# Patient Record
Sex: FEMALE | Race: BLACK OR AFRICAN AMERICAN | Employment: FULL TIME | URBAN - METROPOLITAN AREA
[De-identification: names, ages, dates, MRNs, and addresses within clinical notes are randomized per-mention and may not be internally consistent; named-entity substitution may affect disease eponyms.]

---

## 2019-07-15 ENCOUNTER — ANESTHESIA EVENT (OUTPATIENT)
Dept: ENDOSCOPY | Age: 44
End: 2019-07-15
Payer: COMMERCIAL

## 2019-07-15 NOTE — ANESTHESIA PREPROCEDURE EVALUATION
Relevant Problems   No relevant active problems       Anesthetic History   No history of anesthetic complications            Review of Systems / Medical History  Patient summary reviewed, nursing notes reviewed and pertinent labs reviewed    Pulmonary        Sleep apnea: CPAP           Neuro/Psych   Within defined limits           Cardiovascular    Hypertension                   GI/Hepatic/Renal  Within defined limits              Endo/Other    Diabetes    Morbid obesity     Other Findings            Physical Exam    Airway  Mallampati: III  TM Distance: > 6 cm  Neck ROM: normal range of motion   Mouth opening: Normal     Cardiovascular  Regular rate and rhythm,  S1 and S2 normal,  no murmur, click, rub, or gallop             Dental  No notable dental hx       Pulmonary  Breath sounds clear to auscultation               Abdominal  GI exam deferred       Other Findings            Anesthetic Plan    ASA: 3  Anesthesia type: MAC            Anesthetic plan and risks discussed with: Patient

## 2019-07-16 ENCOUNTER — HOSPITAL ENCOUNTER (OUTPATIENT)
Age: 44
Setting detail: OUTPATIENT SURGERY
Discharge: HOME | End: 2019-07-16
Attending: INTERNAL MEDICINE | Admitting: INTERNAL MEDICINE
Payer: COMMERCIAL

## 2019-07-16 ENCOUNTER — ANESTHESIA (OUTPATIENT)
Dept: ENDOSCOPY | Age: 44
End: 2019-07-16
Payer: COMMERCIAL

## 2019-07-16 VITALS
HEART RATE: 88 BPM | TEMPERATURE: 97 F | RESPIRATION RATE: 15 BRPM | DIASTOLIC BLOOD PRESSURE: 91 MMHG | WEIGHT: 293 LBS | HEIGHT: 67 IN | BODY MASS INDEX: 45.99 KG/M2 | OXYGEN SATURATION: 97 % | SYSTOLIC BLOOD PRESSURE: 138 MMHG

## 2019-07-16 LAB — HCG UR QL: NEGATIVE

## 2019-07-16 PROCEDURE — 74011250636 HC RX REV CODE- 250/636

## 2019-07-16 PROCEDURE — 77030021593 HC FCPS BIOP ENDOSC BSC -A: Performed by: INTERNAL MEDICINE

## 2019-07-16 PROCEDURE — 77030027957 HC TBNG IRR ENDOGTR BUSS -B: Performed by: INTERNAL MEDICINE

## 2019-07-16 PROCEDURE — 88305 TISSUE EXAM BY PATHOLOGIST: CPT

## 2019-07-16 PROCEDURE — 76060000031 HC ANESTHESIA FIRST 0.5 HR: Performed by: INTERNAL MEDICINE

## 2019-07-16 PROCEDURE — 81025 URINE PREGNANCY TEST: CPT

## 2019-07-16 PROCEDURE — 77030009426 HC FCPS BIOP ENDOSC BSC -B: Performed by: INTERNAL MEDICINE

## 2019-07-16 PROCEDURE — 76040000019: Performed by: INTERNAL MEDICINE

## 2019-07-16 RX ORDER — MIDAZOLAM HYDROCHLORIDE 1 MG/ML
.25-5 INJECTION, SOLUTION INTRAMUSCULAR; INTRAVENOUS
Status: DISCONTINUED | OUTPATIENT
Start: 2019-07-16 | End: 2019-07-16 | Stop reason: HOSPADM

## 2019-07-16 RX ORDER — FENTANYL CITRATE 50 UG/ML
25-200 INJECTION, SOLUTION INTRAMUSCULAR; INTRAVENOUS
Status: DISCONTINUED | OUTPATIENT
Start: 2019-07-16 | End: 2019-07-16 | Stop reason: HOSPADM

## 2019-07-16 RX ORDER — SODIUM CHLORIDE 9 MG/ML
50 INJECTION, SOLUTION INTRAVENOUS CONTINUOUS
Status: DISCONTINUED | OUTPATIENT
Start: 2019-07-16 | End: 2019-07-16 | Stop reason: HOSPADM

## 2019-07-16 RX ORDER — PROPOFOL 10 MG/ML
INJECTION, EMULSION INTRAVENOUS AS NEEDED
Status: DISCONTINUED | OUTPATIENT
Start: 2019-07-16 | End: 2019-07-16 | Stop reason: HOSPADM

## 2019-07-16 RX ORDER — EPINEPHRINE 0.1 MG/ML
1 INJECTION INTRACARDIAC; INTRAVENOUS
Status: DISCONTINUED | OUTPATIENT
Start: 2019-07-16 | End: 2019-07-16 | Stop reason: HOSPADM

## 2019-07-16 RX ORDER — DEXTROMETHORPHAN/PSEUDOEPHED 2.5-7.5/.8
1.2 DROPS ORAL
Status: DISCONTINUED | OUTPATIENT
Start: 2019-07-16 | End: 2019-07-16 | Stop reason: HOSPADM

## 2019-07-16 RX ORDER — LISINOPRIL 10 MG/1
10 TABLET ORAL DAILY
COMMUNITY

## 2019-07-16 RX ORDER — METFORMIN HYDROCHLORIDE 500 MG/1
2000 TABLET ORAL DAILY
COMMUNITY

## 2019-07-16 RX ORDER — MONTELUKAST SODIUM 4 MG/1
1 TABLET, CHEWABLE ORAL 2 TIMES DAILY
COMMUNITY

## 2019-07-16 RX ORDER — FLUMAZENIL 0.1 MG/ML
0.2 INJECTION INTRAVENOUS
Status: DISCONTINUED | OUTPATIENT
Start: 2019-07-16 | End: 2019-07-16 | Stop reason: HOSPADM

## 2019-07-16 RX ORDER — SODIUM CHLORIDE 9 MG/ML
INJECTION, SOLUTION INTRAVENOUS
Status: DISCONTINUED | OUTPATIENT
Start: 2019-07-16 | End: 2019-07-16 | Stop reason: HOSPADM

## 2019-07-16 RX ORDER — LIDOCAINE HYDROCHLORIDE 20 MG/ML
INJECTION, SOLUTION EPIDURAL; INFILTRATION; INTRACAUDAL; PERINEURAL AS NEEDED
Status: DISCONTINUED | OUTPATIENT
Start: 2019-07-16 | End: 2019-07-16 | Stop reason: HOSPADM

## 2019-07-16 RX ORDER — BISMUTH SUBSALICYLATE 262 MG
1 TABLET,CHEWABLE ORAL DAILY
COMMUNITY

## 2019-07-16 RX ORDER — SODIUM CHLORIDE 0.9 % (FLUSH) 0.9 %
5-40 SYRINGE (ML) INJECTION EVERY 8 HOURS
Status: DISCONTINUED | OUTPATIENT
Start: 2019-07-16 | End: 2019-07-16 | Stop reason: HOSPADM

## 2019-07-16 RX ORDER — ATROPINE SULFATE 0.1 MG/ML
0.5 INJECTION INTRAVENOUS
Status: DISCONTINUED | OUTPATIENT
Start: 2019-07-16 | End: 2019-07-16 | Stop reason: HOSPADM

## 2019-07-16 RX ORDER — SODIUM CHLORIDE 0.9 % (FLUSH) 0.9 %
5-40 SYRINGE (ML) INJECTION AS NEEDED
Status: DISCONTINUED | OUTPATIENT
Start: 2019-07-16 | End: 2019-07-16 | Stop reason: HOSPADM

## 2019-07-16 RX ORDER — NALOXONE HYDROCHLORIDE 0.4 MG/ML
0.4 INJECTION, SOLUTION INTRAMUSCULAR; INTRAVENOUS; SUBCUTANEOUS
Status: DISCONTINUED | OUTPATIENT
Start: 2019-07-16 | End: 2019-07-16 | Stop reason: HOSPADM

## 2019-07-16 RX ADMIN — PROPOFOL 50 MG: 10 INJECTION, EMULSION INTRAVENOUS at 08:18

## 2019-07-16 RX ADMIN — PROPOFOL 100 MG: 10 INJECTION, EMULSION INTRAVENOUS at 08:20

## 2019-07-16 RX ADMIN — LIDOCAINE HYDROCHLORIDE 40 MG: 20 INJECTION, SOLUTION EPIDURAL; INFILTRATION; INTRACAUDAL; PERINEURAL at 08:14

## 2019-07-16 RX ADMIN — PROPOFOL 50 MG: 10 INJECTION, EMULSION INTRAVENOUS at 08:22

## 2019-07-16 RX ADMIN — PROPOFOL 50 MG: 10 INJECTION, EMULSION INTRAVENOUS at 08:24

## 2019-07-16 RX ADMIN — PROPOFOL 50 MG: 10 INJECTION, EMULSION INTRAVENOUS at 08:16

## 2019-07-16 RX ADMIN — PROPOFOL 100 MG: 10 INJECTION, EMULSION INTRAVENOUS at 08:14

## 2019-07-16 RX ADMIN — PROPOFOL 50 MG: 10 INJECTION, EMULSION INTRAVENOUS at 08:26

## 2019-07-16 RX ADMIN — SODIUM CHLORIDE: 9 INJECTION, SOLUTION INTRAVENOUS at 08:14

## 2019-07-16 NOTE — ROUTINE PROCESS
Ninoska Layo  1975  [id number]    Situation:  Verbal report received from: BRENDA Keith  Procedure: Procedure(s):  COLONOSCOPY  COLON BIOPSY  ENDOSCOPIC POLYPECTOMY    Background:    Preoperative diagnosis: DIARRHEA  Postoperative diagnosis: polyp    :  Dr. Anju Chong  Assistant(s): Endoscopy Technician-1: Bulmaro Sanchez  Endoscopy RN-1: Peter Mina RN    Specimens:   ID Type Source Tests Collected by Time Destination   1 : random colon Preservative Random colon  Wash MD Adam 7/16/2019 3530 Pathology   2 : polyp Preservative Sigmoid  Wash MD Adam 7/16/2019 2058 Pathology     H. Pylori  no    Assessment:  Intra-procedure medications        Anesthesia gave intra-procedure sedation and medications, see anesthesia flow sheet yes    Intravenous fluids:  500 NS @ KVO     Vital signs stable yes    Abdominal assessment: round and soft yes    Recommendation:  Discharge patient per MD order yes.   Return to floor no  Family or Friend : wife  Permission to share finding with family or friend yes

## 2019-07-16 NOTE — ANESTHESIA POSTPROCEDURE EVALUATION
Post-Anesthesia Evaluation and Assessment    Patient: Esequiel Stark MRN: [id number]  SSN: [id number]    YOB: 1975  Age: 44 y.o. Sex: female      I have evaluated the patient and they are stable and ready for discharge from the PACU. Cardiovascular Function/Vital Signs  Visit Vitals  BP (!) 138/91   Pulse 88   Temp 36.1 °C (97 °F)   Resp 15   Ht 5' 7\" (1.702 m)   Wt 147.4 kg (325 lb)   SpO2 97%   BMI 50.90 kg/m²       Patient is status post MAC anesthesia for Procedure(s):  COLONOSCOPY  COLON BIOPSY  ENDOSCOPIC POLYPECTOMY. Nausea/Vomiting: None    Postoperative hydration reviewed and adequate. Pain:  Pain Scale 1: Numeric (0 - 10) (07/16/19 6519)  Pain Intensity 1: 0 (07/16/19 2102)   Managed    Neurological Status: At baseline    Mental Status, Level of Consciousness: Alert and  oriented to person, place, and time    Pulmonary Status:   O2 Device: Room air (07/16/19 5030)   Adequate oxygenation and airway patent    Complications related to anesthesia: None    Post-anesthesia assessment completed. No concerns    Signed By: Lucius Mooney MD     July 16, 2019              Procedure(s):  COLONOSCOPY  COLON BIOPSY  ENDOSCOPIC POLYPECTOMY. MAC    <BSHSIANPOST>    Vitals Value Taken Time   /91 7/16/2019  8:52 AM   Temp     Pulse 84 7/16/2019  8:56 AM   Resp 0 7/16/2019  9:09 AM   SpO2 97 % 7/16/2019  8:56 AM   Vitals shown include unvalidated device data.

## 2019-07-16 NOTE — H&P
The patient is a 44year old female who presents with a complaint of Bloating. The patient presents due to worsening symptoms. The symptoms have been associated with abdominal pain, bloody stools and drinking large amounts of carbonated beverages, while the symptoms have not been associated with amenorrhea, anorexia, anxiety, chewing gum, constipation, diarrhea, dysmenorrhea, dysuria, hard stools, hematemesis, hematuria, history of abdominal surgery, melena, peripheral edema, poor fitting dentures, possible pregnancy, post-nasal drip, rapid eating, steatorrhea, vaginal bleeding, weight gain or weight loss. Note for \"Bloating\": she has h/o IBS and chronic bloating, intermittent abdominal pain for 20 years since after lap chol, she c/o worrsening of those sympotms for last few months, she was seen in 2017 by me, i recommended her to get colonoscopy but did not do it. she failed bentyl, align, and IB beatrice. her diarrhea got better on daily colestipol. she also c/o intermittent rectal bleeding      Problem List/Past Medical ([facility]; 2019 11:05 AM)  Diabetes Mellitus, Type II    Hypertension    Irritable bowel syndrome    Esophageal reflux (530.81  K21.9)    Abdominal pain, epigastric (789.06  R10.13)    Abdominal pain, RUQ (789.01  R10.11)    Diarrhea, functional (564.5  K59.1)    Abdominal pain (789.00  R10.9)    Diarrhea (787.91  R19.7)    Generalized abdominal pain (789.07  R10.84)    Bloating (787.3  R14.0)      Past Surgical History ([facility]; 2019 11:05 AM)   Delivery    Cholecystectomy      Allergies (Daline Roelofse; 2019 11:05 AM)  No Known Allergies  [10/06/2016]:  No Known Drug Allergies  [10/06/2016]:    Medication History (Daline Roelofse; 2019 11:08 AM)  Lisinopril  (10MG Tablet, 1 Oral qd) Active. Colestipol HCl  (1GM Tablet, 2 tabs Oral bid) Active. MetFORMIN HCl  (1000MG Tablet, 4 tabs Oral qhs) Active.   Medications Reconciled     Family History ([facility]; 6/13/2019 11:09 AM)  Non-Contributory Family History      Social History ([facility]; 6/13/2019 11:05 AM)  Marital status   Single. Blood Transfusion   No.  Alcohol Use   Has never drank. Employment status   Full-time. Tobacco Use   Never smoker. Diagnostic Studies History ([facility]; 6/13/2019 11:05 AM)  Endoscopy   Date: 2005. Health Maintenance History ([facility]; 6/13/2019 11:05 AM)  Pneumovax   none  Flu Vaccine   Date: 2018. Review of Systems ([facility]; 6/13/2019 11:05 AM)  General Not Present- Chronic Fatigue, Poor Appetite, Weight Gain and Weight Loss. Skin Not Present- Itching, Rash and Skin Color Changes. HEENT Not Present- Hearing Loss and Vertigo. Respiratory Not Present- Difficulty Breathing and TB exposure. Cardiovascular Not Present- Chest Pain, Use of Antibiotics before Dental Procedures and Use of Blood Thinners. Gastrointestinal Present- See HPI. Musculoskeletal Not Present- Arthritis, Hip Replacement Surgery and Knee Replacement Surgery. Neurological Not Present- Weakness. Psychiatric Not Present- Depression. Endocrine Not Present- Diabetes and Thyroid Problems. Hematology Not Present- Anemia. Vitals ([facility]; 6/13/2019 11:08 AM)  6/13/2019 11:05 AM  Weight: 321 lb   Height: 66 in   Body Surface Area: 2.45 m²   Body Mass Index: 51.81 kg/m²    Temp.: 97.8° F (Temporal)    Pulse: 74 (Regular)    Resp. : 16 (Unlabored)     BP: 142/80 (Sitting, Left Arm, Standard)              Physical Exam Raymona Babinski MD; 6/13/2019 12:57 PM)  General  Mental Status - Alert. General Appearance - Cooperative, Pleasant, Not in acute distress. Orientation - Oriented X3. Build & Nutrition - Obese and Well developed. Chest and Lung Exam  Chest and lung exam reveals  - quiet, even and easy respiratory effort with no use of accessory muscles.   Auscultation  Breath sounds - Normal. Adventitious sounds - No Adventitious sounds. Cardiovascular  Auscultation  Rhythm - Regular, No Tachycardia, No Bradycardia . Heart Sounds - Normal heart sounds , S1 WNL and S2 WNL. Murmurs & Other Heart Sounds - Auscultation of the heart reveals - No Murmurs. Abdomen  Palpation/Percussion  Tenderness - Non-Tender. Rebound tenderness - No rebound. Rigidity (guarding) - No Rigidity. Dullness to percussion - No abnormal dullness to percussion. Liver - No hepatosplenomegaly. Abdominal Mass Palpable - No masses. Other Characteristics - No Ascites. Auscultation  Auscultation of the abdomen reveals - Bowel sounds normal, No Abdominal bruits and No Succussion splash. Assessment & Plan (Lupe Gillespie MD; 6/13/2019 1:00 PM)  Rectal/anal hemorrhage (569.3  K62.5)  Impression: she has h/o IBS and chronic bloating, intermittent abdominal pain for 20 years since after lap chol, she c/o worrsening of those sympotms for last few months, she was seen in 2017 by me, i recommended her to get colonoscopy but did not do it. she failed bentyl, align, and IB beatrice. her diarrhea got better on daily colestipol. she also c/o intermittent rectal bleeding  colonoscopy to r/o any colitis or colonic neoplasm  given clenpiq prep sample  Current Plans  Colonoscopy (73825) (Discussed risks and benefits with the patient to include:; perforation, post polypectomy, or post biopsy bleeding, missed lesions, and sedation reactions.)  Pt Education - How to access health information online: discussed with patient and provided information. Bloating (787.3  R14.0)  Impression: trial of low FOADMAP diet  refered to Dr. Camille Virgen for food allergy testing  Diarrhea (787.91  R19.7)  Generalized abdominal pain (789.07  R10.84)  Impression: will consider abdominal imaging if not better  Current Plans  Started Hyoscyamine Sulfate 0.125MG, 1 (one) Tablet TID as needed for abdominal pain, #30, 30 days starting 06/13/2019, Ref.  X6.  Date of Surgery Update:  Beth Bledsoe was seen and examined. History and physical has been reviewed. The patient has been examined.  There have been no significant clinical changes since the completion of the originally dated History and Physical.    Signed By: Ashley Madrid MD     July 16, 2019 7:57 AM

## 2019-07-16 NOTE — DISCHARGE INSTRUCTIONS
[address]  [address] [facility], [address] [place] [address]    COLON DISCHARGE INSTRUCTIONS    Leelee Zhu  [id number]  1975    Discomfort:  Redness at IV site- apply warm compress to area; if redness or soreness persist- contact your physician  There may be a slight amount of blood passed from the rectum  Gaseous discomfort- walking, belching will help relieve any discomfort  You may not operate a vehicle for 12 hours  You may not engage in an occupation involving machinery or appliances for rest of today  You may not drink alcoholic beverages for at least 12 hours  Avoid making any critical decisions for at least 24 hour  DIET:  You may resume your regular diet - however -  remember your colon is empty and a heavy meal will produce gas. Avoid these foods:  vegetables, fried / greasy foods, carbonated drinks     ACTIVITY:  You may  resume your normal daily activities it is recommended that you spend the remainder of the day resting -  avoid any strenuous activity. CALL M.D. ANY SIGN OF:   Increasing pain, nausea, vomiting  Abdominal distension (swelling)  New increased bleeding (oral or rectal)  Fever (chills)  Pain in chest area  Bloody discharge from nose or mouth  Shortness of breath      Follow-up Instructions:   Call Dr. Yodit Fenton for any questions or problems at [phone] and follow up with him in 1 month  Will notify you of the biopsies results            ENDOSCOPY FINDINGS:   Your colonoscopy showed one small polyp removed, rest of exam was normal, biopsies taken.   Telephone # [phone]      Signed By: Yodit Fenton MD     7/16/2019  8:39 AM       DISCHARGE SUMMARY from Nurse    The following personal items collected during your admission are returned to you:   Dental Appliance: Dental Appliances: None  Vision: Visual Aid: Glasses  Hearing Aid:    Jewelry:    Clothing:    Other Valuables:    Valuables sent to safe:

## 2019-07-16 NOTE — PERIOP NOTES

## 2019-07-16 NOTE — PROCEDURES
[facility]  [place], [address]      Colonoscopy Operative Report    Madeleine Blake  [id number]  1975      Procedure Type:   Colonoscopy with biopsy, polypectomy (hot biopsy)     Indications:    Diarrhea       Pre-operative Diagnosis: see indication above    Post-operative Diagnosis:  See findings below    :  Arthur Padron MD    Surgical Assistant: None    Implants:  None    Referring Provider: Diana Restrepo MD      Sedation:  MAC anesthesia Propofol      Procedure Details:  After informed consent was obtained with all risks and benefits of procedure explained and preoperative exam completed, the patient was taken to the endoscopy suite and placed in the left lateral decubitus position. Upon sequential sedation as per above, a digital rectal exam was performed demonstrating internal hemorrhoids. The Olympus videocolonoscope  was inserted in the rectum and carefully advanced to the cecum, which was identified by the ileocecal valve and appendiceal orifice, terminal ileum. The cecum was identified by the ileocecal valve and appendiceal orifice. The quality of preparation was good. The colonoscope was slowly withdrawn with careful evaluation between folds. Retroflexion in the rectum was completed . Findings:   Rectum: normal  Small internal hemorrhoids  Sigmoid: normal  9 mm polyp removed by hot biopsy  Descending Colon: normal  Transverse Colon: normal  Ascending Colon: normal  Cecum: normal  Terminal Ileum: normal    Random colonic biopsies taken      Specimen Removed:  as above    Complications: None. EBL:  None. Impression:    see findings    Recommendations: --Await pathology.       Follow up in 1 month  Continue on colestipol    Signed By: Arthur Padron MD     7/16/2019  8:34 AM

## 2023-02-02 RX ORDER — GLIPIZIDE 10 MG/1
10 TABLET, FILM COATED, EXTENDED RELEASE ORAL DAILY
COMMUNITY

## 2023-02-02 RX ORDER — ATORVASTATIN CALCIUM 10 MG/1
10 TABLET, FILM COATED ORAL DAILY
COMMUNITY

## 2023-02-02 RX ORDER — VALACYCLOVIR HYDROCHLORIDE 500 MG/1
TABLET, FILM COATED ORAL AS NEEDED
COMMUNITY

## 2023-02-02 RX ORDER — LISINOPRIL 40 MG/1
40 TABLET ORAL DAILY
COMMUNITY

## 2023-02-02 RX ORDER — METFORMIN HYDROCHLORIDE 500 MG/1
2000 TABLET, EXTENDED RELEASE ORAL EVERY EVENING
COMMUNITY

## 2023-02-02 RX ORDER — ACETAMINOPHEN AND CODEINE PHOSPHATE 120; 12 MG/5ML; MG/5ML
1 SOLUTION ORAL DAILY
COMMUNITY

## 2023-02-02 RX ORDER — DULAGLUTIDE 4.5 MG/.5ML
4.5 INJECTION, SOLUTION SUBCUTANEOUS
COMMUNITY

## 2023-02-02 RX ORDER — LANOLIN ALCOHOL/MO/W.PET/CERES
1000 CREAM (GRAM) TOPICAL DAILY
COMMUNITY

## 2023-02-09 ENCOUNTER — ANESTHESIA EVENT (OUTPATIENT)
Dept: ENDOSCOPY | Age: 48
End: 2023-02-09
Payer: COMMERCIAL

## 2023-02-09 ENCOUNTER — HOSPITAL ENCOUNTER (OUTPATIENT)
Age: 48
Setting detail: OUTPATIENT SURGERY
Discharge: HOME | End: 2023-02-09
Attending: INTERNAL MEDICINE | Admitting: INTERNAL MEDICINE
Payer: COMMERCIAL

## 2023-02-09 ENCOUNTER — ANESTHESIA (OUTPATIENT)
Dept: ENDOSCOPY | Age: 48
End: 2023-02-09
Payer: COMMERCIAL

## 2023-02-09 VITALS
TEMPERATURE: 98.6 F | OXYGEN SATURATION: 94 % | HEIGHT: 66 IN | SYSTOLIC BLOOD PRESSURE: 132 MMHG | RESPIRATION RATE: 21 BRPM | WEIGHT: 293 LBS | DIASTOLIC BLOOD PRESSURE: 79 MMHG | BODY MASS INDEX: 47.09 KG/M2 | HEART RATE: 92 BPM

## 2023-02-09 LAB
GLUCOSE BLD STRIP.AUTO-MCNC: 131 MG/DL (ref 65–117)
HCG UR QL: NEGATIVE
SERVICE CMNT-IMP: ABNORMAL

## 2023-02-09 PROCEDURE — 77030021593 HC FCPS BIOP ENDOSC BSC -A: Performed by: INTERNAL MEDICINE

## 2023-02-09 PROCEDURE — 74011250636 HC RX REV CODE- 250/636: Performed by: ANESTHESIOLOGY

## 2023-02-09 PROCEDURE — 76060000031 HC ANESTHESIA FIRST 0.5 HR: Performed by: INTERNAL MEDICINE

## 2023-02-09 PROCEDURE — 76040000019: Performed by: INTERNAL MEDICINE

## 2023-02-09 PROCEDURE — 82962 GLUCOSE BLOOD TEST: CPT

## 2023-02-09 PROCEDURE — 77030039825 HC MSK NSL PAP SUPERNO2VA VYRM -B: Performed by: ANESTHESIOLOGY

## 2023-02-09 PROCEDURE — 88342 IMHCHEM/IMCYTCHM 1ST ANTB: CPT

## 2023-02-09 PROCEDURE — 81025 URINE PREGNANCY TEST: CPT

## 2023-02-09 PROCEDURE — 2709999900 HC NON-CHARGEABLE SUPPLY: Performed by: INTERNAL MEDICINE

## 2023-02-09 PROCEDURE — 74011000250 HC RX REV CODE- 250: Performed by: ANESTHESIOLOGY

## 2023-02-09 PROCEDURE — 88305 TISSUE EXAM BY PATHOLOGIST: CPT

## 2023-02-09 RX ORDER — PROPOFOL 10 MG/ML
INJECTION, EMULSION INTRAVENOUS AS NEEDED
Status: DISCONTINUED | OUTPATIENT
Start: 2023-02-09 | End: 2023-02-09 | Stop reason: HOSPADM

## 2023-02-09 RX ORDER — EPINEPHRINE 0.1 MG/ML
1 INJECTION INTRACARDIAC; INTRAVENOUS
Status: DISCONTINUED | OUTPATIENT
Start: 2023-02-09 | End: 2023-02-09 | Stop reason: HOSPADM

## 2023-02-09 RX ORDER — SODIUM CHLORIDE 0.9 % (FLUSH) 0.9 %
5-40 SYRINGE (ML) INJECTION EVERY 8 HOURS
Status: DISCONTINUED | OUTPATIENT
Start: 2023-02-09 | End: 2023-02-09 | Stop reason: HOSPADM

## 2023-02-09 RX ORDER — FLUMAZENIL 0.1 MG/ML
0.2 INJECTION INTRAVENOUS
Status: DISCONTINUED | OUTPATIENT
Start: 2023-02-09 | End: 2023-02-09 | Stop reason: HOSPADM

## 2023-02-09 RX ORDER — DEXTROMETHORPHAN/PSEUDOEPHED 2.5-7.5/.8
1.2 DROPS ORAL
Status: DISCONTINUED | OUTPATIENT
Start: 2023-02-09 | End: 2023-02-09 | Stop reason: HOSPADM

## 2023-02-09 RX ORDER — SODIUM CHLORIDE 0.9 % (FLUSH) 0.9 %
5-40 SYRINGE (ML) INJECTION AS NEEDED
Status: DISCONTINUED | OUTPATIENT
Start: 2023-02-09 | End: 2023-02-09 | Stop reason: HOSPADM

## 2023-02-09 RX ORDER — FENTANYL CITRATE 50 UG/ML
25-200 INJECTION, SOLUTION INTRAMUSCULAR; INTRAVENOUS
Status: DISCONTINUED | OUTPATIENT
Start: 2023-02-09 | End: 2023-02-09 | Stop reason: HOSPADM

## 2023-02-09 RX ORDER — ATROPINE SULFATE 0.1 MG/ML
0.5 INJECTION INTRAVENOUS
Status: DISCONTINUED | OUTPATIENT
Start: 2023-02-09 | End: 2023-02-09 | Stop reason: HOSPADM

## 2023-02-09 RX ORDER — NALOXONE HYDROCHLORIDE 0.4 MG/ML
0.4 INJECTION, SOLUTION INTRAMUSCULAR; INTRAVENOUS; SUBCUTANEOUS
Status: DISCONTINUED | OUTPATIENT
Start: 2023-02-09 | End: 2023-02-09 | Stop reason: HOSPADM

## 2023-02-09 RX ORDER — MIDAZOLAM HYDROCHLORIDE 1 MG/ML
.25-5 INJECTION, SOLUTION INTRAMUSCULAR; INTRAVENOUS
Status: DISCONTINUED | OUTPATIENT
Start: 2023-02-09 | End: 2023-02-09 | Stop reason: HOSPADM

## 2023-02-09 RX ORDER — SODIUM CHLORIDE 9 MG/ML
50 INJECTION, SOLUTION INTRAVENOUS CONTINUOUS
Status: DISCONTINUED | OUTPATIENT
Start: 2023-02-09 | End: 2023-02-09 | Stop reason: HOSPADM

## 2023-02-09 RX ORDER — LIDOCAINE HYDROCHLORIDE 20 MG/ML
INJECTION, SOLUTION EPIDURAL; INFILTRATION; INTRACAUDAL; PERINEURAL AS NEEDED
Status: DISCONTINUED | OUTPATIENT
Start: 2023-02-09 | End: 2023-02-09 | Stop reason: HOSPADM

## 2023-02-09 RX ORDER — SODIUM CHLORIDE 9 MG/ML
INJECTION, SOLUTION INTRAVENOUS
Status: DISCONTINUED | OUTPATIENT
Start: 2023-02-09 | End: 2023-02-09 | Stop reason: HOSPADM

## 2023-02-09 RX ADMIN — PROPOFOL 100 MG: 10 INJECTION, EMULSION INTRAVENOUS at 10:31

## 2023-02-09 RX ADMIN — PROPOFOL 50 MG: 10 INJECTION, EMULSION INTRAVENOUS at 10:34

## 2023-02-09 RX ADMIN — LIDOCAINE HYDROCHLORIDE 80 MG: 20 INJECTION, SOLUTION EPIDURAL; INFILTRATION; INTRACAUDAL; PERINEURAL at 10:30

## 2023-02-09 RX ADMIN — PROPOFOL 50 MG: 10 INJECTION, EMULSION INTRAVENOUS at 10:38

## 2023-02-09 RX ADMIN — PROPOFOL 100 MG: 10 INJECTION, EMULSION INTRAVENOUS at 10:30

## 2023-02-09 RX ADMIN — PROPOFOL 100 MG: 10 INJECTION, EMULSION INTRAVENOUS at 10:40

## 2023-02-09 RX ADMIN — PROPOFOL 50 MG: 10 INJECTION, EMULSION INTRAVENOUS at 10:35

## 2023-02-09 RX ADMIN — PROPOFOL 100 MG: 10 INJECTION, EMULSION INTRAVENOUS at 10:44

## 2023-02-09 RX ADMIN — SODIUM CHLORIDE: 900 INJECTION, SOLUTION INTRAVENOUS at 10:14

## 2023-02-09 RX ADMIN — PROPOFOL 50 MG: 10 INJECTION, EMULSION INTRAVENOUS at 10:37

## 2023-02-09 RX ADMIN — PROPOFOL 100 MG: 10 INJECTION, EMULSION INTRAVENOUS at 10:51

## 2023-02-09 RX ADMIN — PROPOFOL 100 MG: 10 INJECTION, EMULSION INTRAVENOUS at 10:48

## 2023-02-09 NOTE — DISCHARGE INSTRUCTIONS
[address], [address] [place] [address]    EGD/COLON DISCHARGE INSTRUCTIONS    Ld Neely  [id number]  1975    Discomfort:  Sore throat- throat lozenges or warm salt water gargle  redness at IV site- apply warm compress to area; if redness or soreness persist- contact your physician  Gaseous discomfort- walking, belching will help relieve any discomfort  You may not operate a vehicle for 12 hours  You may not engage in an occupation involving machinery or appliances for rest of today  You may not drink alcoholic beverages for at least 12 hours  Avoid making any critical decisions for at least 24 hour  DIET  You may resume your regular diet - however -  remember your colon is empty and a heavy meal will produce gas. Avoid these foods:  vegetables, fried / greasy foods, carbonated drinks    ACTIVITY  You may resume your normal daily activities   Spend the remainder of the day resting -  avoid any strenuous activity. CALL M.D. ANY SIGN OF   Increasing pain, nausea, vomiting  Abdominal distension (swelling)  New increased bleeding (oral or rectal)  Fever (chills)  Pain in chest area  Bloody discharge from nose or mouth  Shortness of breath    Follow-up Instructions:   Call Dr. Kalin Morillo for any questions or problems. Telephone # [phone]    ENDOSCOPY FINDINGS:   Your endoscopy showed mild gastritis for which biopsies were taken. We will contact you about the pathology results. Please avoid NSAID's, alcohol, tobacco products. Your colonoscopy was normal. Your next colonoscopy will be due in 10 years. Signed By: Bryan Rivera. Bunny Figueredo MD     2/9/2023  11:01 AM          Gastritis: Care Instructions  Your Care Instructions     Gastritis is a sore and upset stomach. It happens when something irritates the stomach lining. Many things can cause it. These include an infection such as the flu or something you ate or drank.  Medicines or a sore on the lining of the stomach (ulcer) also can cause it. Your belly may bloat and ache. You may belch, vomit, and feel sick to your stomach. You should be able to relieve the problem by taking medicine. And it may help to change your diet. If gastritis lasts, your doctor may prescribe medicine. Follow-up care is a key part of your treatment and safety. Be sure to make and go to all appointments, and call your doctor if you are having problems. It's also a good idea to know your test results and keep a list of the medicines you take. How can you care for yourself at home? If your doctor prescribed antibiotics, take them as directed. Do not stop taking them just because you feel better. You need to take the full course of antibiotics. Be safe with medicines. If your doctor prescribed medicine to decrease stomach acid, take it as directed. Call your doctor if you think you are having a problem with your medicine. Do not take any other medicine, including over-the-counter pain relievers, without talking to your doctor first.  If your doctor recommends over-the-counter medicine to reduce stomach acid, such as Pepcid AC (famotidine), Prilosec (omeprazole), or Tagamet HB (cimetidine) follow the directions on the label. Drink plenty of fluids to prevent dehydration. Choose water and other clear liquids. If you have kidney, heart, or liver disease and have to limit fluids, talk with your doctor before you increase the amount of fluids you drink. Avoid foods that make your symptoms worse. These may include chocolate, mint, alcohol, pepper, spicy foods, high-fat foods, or drinks with caffeine in them, such as tea, coffee, johanna, or energy drinks. If your symptoms are worse after you eat a certain food, you may want to stop eating it to see if your symptoms get better. When should you call for help? Call 911 anytime you think you may need emergency care. For example, call if:    You vomit blood or what looks like coffee grounds.      You pass maroon or very bloody stools. Call your doctor now or seek immediate medical care if:    You start breathing fast and have not produced urine in the last 8 hours. You cannot keep fluids down. Watch closely for changes in your health, and be sure to contact your doctor if:    You do not get better as expected. Where can you learn more? Go to http://www.clinton.com/  Enter Z536 in the search box to learn more about \"Gastritis: Care Instructions. \"  Current as of: June 6, 2022               Content Version: 13.4  © 0077-9341 [employer]. Care instructions adapted under license by [employer] (which disclaims liability or warranty for this information). If you have questions about a medical condition or this instruction, always ask your healthcare professional. [address] any warranty or liability for your use of this information.

## 2023-02-09 NOTE — ANESTHESIA POSTPROCEDURE EVALUATION
Procedure(s):  ESOPHAGOGASTRODUODENOSCOPY (EGD)  COLONOSCOPY  ESOPHAGOGASTRODUODENAL (EGD) BIOPSY. MAC    Anesthesia Post Evaluation        Patient location during evaluation: PACU  Patient participation: complete - patient participated  Level of consciousness: awake  Pain management: adequate  Airway patency: patent  Anesthetic complications: no  Cardiovascular status: acceptable  Respiratory status: acceptable  Hydration status: acceptable  Post anesthesia nausea and vomiting:  none  Final Post Anesthesia Temperature Assessment:  Normothermia (36.0-37.5 degrees C)      INITIAL Post-op Vital signs:   Vitals Value Taken Time   /88 02/09/23 1105   Temp 37 °C (98.6 °F) 02/09/23 1057   Pulse 100 02/09/23 1105   Resp 20 02/09/23 1105   SpO2 94 % 02/09/23 1105   Vitals shown include unvalidated device data.

## 2023-02-09 NOTE — ANESTHESIA PREPROCEDURE EVALUATION
Relevant Problems   No relevant active problems       Anesthetic History   No history of anesthetic complications            Review of Systems / Medical History  Patient summary reviewed, nursing notes reviewed and pertinent labs reviewed    Pulmonary        Sleep apnea: CPAP           Neuro/Psych              Cardiovascular    Hypertension          Hyperlipidemia         GI/Hepatic/Renal     GERD           Endo/Other    Diabetes    Morbid obesity     Other Findings              Physical Exam    Airway  Mallampati: IV  TM Distance: 4 - 6 cm  Neck ROM: normal range of motion   Mouth opening: Normal     Cardiovascular  Regular rate and rhythm,  S1 and S2 normal,  no murmur, click, rub, or gallop             Dental  No notable dental hx       Pulmonary  Breath sounds clear to auscultation               Abdominal         Other Findings            Anesthetic Plan    ASA: 3  Anesthesia type: MAC          Induction: Intravenous  Anesthetic plan and risks discussed with: Patient

## 2023-02-09 NOTE — PROCEDURES
[address]  174 [facility], [address] [place] [address]      Colonoscopy Operative Report    Kecia South  [id number]  1975      Procedure Type:   Colonoscopy --diagnostic     Indications:    history of colitis, elevated CRP          Pre-operative Diagnosis: see indication above    Post-operative Diagnosis:  See findings below    :  Serjio Navarro. Nicole García MD    Staff: Endoscopy Moy Fry: Vickye Cuff  Endoscopy RN-1: Bruce Cavanaugh RN     Referring Provider: Brandan Guzman MD      Sedation:  MAC anesthesia Propofol      Procedure Details:  After informed consent was obtained with all risks and benefits of procedure explained and preoperative exam completed, the patient was taken to the endoscopy suite and placed in the left lateral decubitus position. Upon sequential sedation as per above, a digital rectal exam was performed demonstrating internal hemorrhoids. The Olympus pediatric videocolonoscope  was inserted in the rectum and carefully advanced to the cecum, which was identified by the ileocecal valve and appendiceal orifice. The cecum was identified by the ileocecal valve and appendiceal orifice. The quality of preparation was good. The colonoscope was slowly withdrawn with careful evaluation between folds. Retroflexion in the rectum was completed . Findings:   Rectum: normal  Sigmoid: normal  Descending Colon: normal  Transverse Colon: normal  Ascending Colon: normal  Cecum: normal  Terminal Ileum: not intubated due to anatomical lpositioning and scope looping      Specimen Removed:  none    Complications: None. EBL:  None. Impression:      As above    Recommendations:  Repeat colonoscopy in 10 years  High fiber diet education    Signed By: Serjio Navarro.  Nicole García MD     2/9/2023  11:05 AM

## 2023-02-09 NOTE — PROCEDURES
[address], [address] [place] [address]        [facility]- Gastroduodenoscopy (EGD) Procedure Note    Terri Valera  1975  [id number]      Procedure: Endoscopic Gastroduodenoscopy --diagnostic, with biopsy    Indication:  Abdominal pain, epigastric, GERD     Pre-operative Diagnosis: see indication above    Post-operative Diagnosis: see findings below    : Shi Eden MD    Staff: Endoscopy Technician-1: Sae Shay  Endoscopy RN-1: Joyce Wright RN     Referring Provider:  Kolby Stein MD      Anesthesia/Sedation:  MAC anesthesia Propofol        Procedure Details     After informed consent was obtained for the procedure, with all risks and benefits of procedure explained the patient was taken to the endoscopy suite and placed in the left lateral decubitus position. Following sequential administration of sedation as per above, the endoscope was inserted into the mouth and advanced under direct vision to second portion of the duodenum. A careful inspection was made as the gastroscope was withdrawn, including a retroflexed view of the proximal stomach; findings and interventions are described below. Findings:   Esophagus:normal  Stomach: mild patchy antral erythema - cold biopsies taken  Duodenum: normal to second portion - cold biopsies taken      Therapies:  as above    Specimens: 1. Duodenum, 2. gastric         EBL: None      Complications:   None; patient tolerated the procedure well. Impression:    As above    Recommendations: Follow up surgical pathology  Proceed to colonoscopy    Signed By: Shi Eden MD     2/9/2023  11:03 AM

## 2023-02-09 NOTE — H&P
[address]  174 [facility], [address] [place] [address]      History and Physical       NAME:  Zarina Kasper   :   1975   MRN:   [id number]             History of Present Illness:  Patient is a 56 y.o. who is seen for history of GERD, bloating, and history of colitis. PMH:  Past Medical History:   Diagnosis Date    Diabetes ([address]     GERD (gastroesophageal reflux disease)     Hypertension     Ill-defined condition     elevated cholesterol    Ill-defined condition     sciatica    Morbid obesity (HCC)     Sleep apnea     use cpap       PSH:  Past Surgical History:   Procedure Laterality Date    COLONOSCOPY N/A 2019    COLONOSCOPY performed by Diana Mcmullen MD at [address] [place] [address] [place]      x 2    HX CHOLECYSTECTOMY      HX TONSILLECTOMY         Allergies:  No Known Allergies    Home Medications:  Prior to Admission Medications   Prescriptions Last Dose Informant Patient Reported? Taking? FERROUS SULFATE PO 2023  Yes Yes   Sig: Take 324 mg by mouth two (2) times a day. atorvastatin (LIPITOR) 10 mg tablet 2023  Yes Yes   Sig: Take 10 mg by mouth daily. colestipol (COLESTID) 1 gram tablet   Yes Yes   Sig: Take 2 g by mouth two (2) times a day. cyanocobalamin 1,000 mcg tablet 2023  Yes Yes   Sig: Take 1,000 mcg by mouth daily. dapagliflozin (FARXIGA) 10 mg tab tablet 2023  Yes Yes   Sig: Take 10 mg by mouth daily. dulaglutide (Trulicity) 4.5 WJ/7.4 mL pnij 2022  Yes Yes   Si.5 mg by SubCUTAneous route every seven (7) days. glipiZIDE SR (GLUCOTROL XL) 10 mg CR tablet 2023  Yes Yes   Sig: Take 10 mg by mouth daily. lisinopriL (PRINIVIL, ZESTRIL) 40 mg tablet 2023  Yes Yes   Sig: Take 40 mg by mouth daily. metFORMIN ER (GLUCOPHAGE XR) 500 mg tablet 2023  Yes Yes   Sig: Take 2,000 mg by mouth every evening. norethindrone (MICRONOR) 0.35 mg tab 2023  Yes Yes   Sig: Take 1 Tablet by mouth daily. valACYclovir (VALTREX) 500 mg tablet Unknown  Yes No   Sig: as needed. Facility-Administered Medications: None       Hospital Medications:  No current facility-administered medications for this encounter. Social History:  Social History     Tobacco Use    Smoking status: Never    Smokeless tobacco: Never   Substance Use Topics    Alcohol use: Yes     Comment: rare       Family History:  Family History   Problem Relation Age of Onset    Heart Disease Mother     Heart Disease Sister              Review of Systems:      Constitutional: negative fever, negative chills, negative weight loss  Eyes:   negative visual changes  ENT:   negative sore throat, tongue or lip swelling  Respiratory:  negative cough, negative dyspnea  Cards:  negative for chest pain, palpitations, lower extremity edema  GI:   See HPI  :  negative for frequency, dysuria  Integument:  negative for rash and pruritus  Heme:  negative for easy bruising and gum/nose bleeding  Musculoskel: negative for myalgias,  back pain and muscle weakness  Neuro: negative for headaches, dizziness, vertigo  Psych:  negative for feelings of anxiety, depression       Objective:   Patient Vitals for the past 8 hrs:   Height Weight   02/09/23 0937 5' 6\" (1.676 m) 135.6 kg (299 lb)     No intake/output data recorded. No intake/output data recorded. EXAM:     NEURO-a&o   HEENT-wnl   LUNGS-clear    COR-regular rate and rhythym     ABD-soft , no tenderness, no rebound, good bs     EXT-no edema     Data Review     No results for input(s): WBC, HGB, HCT, PLT, HGBEXT, HCTEXT, PLTEXT in the last 72 hours. No results for input(s): NA, K, CL, CO2, BUN, CREA, GLU, PHOS, CA in the last 72 hours. No results for input(s): AP, TBIL, TP, ALB, GLOB, GGT, AML, LPSE in the last 72 hours. No lab exists for component: SGOT, GPT, AMYP, HLPSE  No results for input(s): INR, PTP, APTT, INREXT in the last 72 hours.        Assessment:     GERD  Bloating  History of colitis There is no problem list on file for this patient. Plan:   The patient was counseled at length about the risks of awais Covid-19 in the john-operative and post-operative states including the recovery window of their procedure. The patient was made aware that awais Covid-19 after a surgical procedure may worsen their prognosis for recovering from the virus and lend to a higher morbidity and or mortality risk. The patient was given the options of postponing their procedure. All of the risks, benefits, and alternatives were discussed. The patient does wish to proceed with the procedure. Endoscopic procedure with MAC     Signed By: Diamond Núñez.  Sarai Kesy MD     2/9/2023  10:16 AM

## (undated) DEVICE — Z DISCONTINUED USE 2751540 TUBING IRRIG L10IN DISP PMP ENDOGATOR

## (undated) DEVICE — NEONATAL-ADULT SPO2 SENSOR: Brand: NELLCOR

## (undated) DEVICE — KENDALL RADIOLUCENT FOAM MONITORING ELECTRODE -RECTANGULAR SHAPE: Brand: KENDALL

## (undated) DEVICE — BAG BELONG PT PERS CLEAR HANDL

## (undated) DEVICE — NEEDLE HYPO 18GA L1.5IN PNK S STL HUB POLYPR SHLD REG BVL

## (undated) DEVICE — BAG SPEC BIOHZD LF 2MIL 6X10IN -- CONVERT TO ITEM 357326

## (undated) DEVICE — UNDERPAD XTR STRGHT 30X36IN --

## (undated) DEVICE — ENDO CARRY-ON PROCEDURE KIT INCLUDES ENZYMATIC SPONGE, GAUZE, BIOHAZARD LABEL, TRAY, LUBRICANT, DIRTY SCOPE LABEL, WATER LABEL, TRAY, DRAWSTRING PAD, AND DEFENDO 4-PIECE KIT.: Brand: ENDO CARRY-ON PROCEDURE KIT

## (undated) DEVICE — AIRLIFE™ U/CONNECT-IT OXYGEN TUBING 7 FEET (2.1 M) CRUSH-RESISTANT OXYGEN TUBING, VINYL TIPPED: Brand: AIRLIFE™

## (undated) DEVICE — FORCEPS BX L240CM JAW DIA2.8MM L CAP W/ NDL MIC MESH TOOTH

## (undated) DEVICE — SYRINGE MED 20ML STD CLR PLAS LUERLOCK TIP N CTRL DISP

## (undated) DEVICE — Z DISCONTINUED NO SUB IDED SET EXTN W/ 4 W STPCOCK M SPIN LOK 36IN

## (undated) DEVICE — BW-412T DISP COMBO CLEANING BRUSH: Brand: SINGLE USE COMBINATION CLEANING BRUSH

## (undated) DEVICE — CONNECTOR TBNG AUX H2O JET DISP FOR OLY 160/180 SER

## (undated) DEVICE — TUBING HYDR IRR --

## (undated) DEVICE — REM POLYHESIVE ADULT PATIENT RETURN ELECTRODE: Brand: VALLEYLAB

## (undated) DEVICE — Device

## (undated) DEVICE — 1200 GUARD II KIT W/5MM TUBE W/O VAC TUBE: Brand: GUARDIAN

## (undated) DEVICE — FCPS BX HOT RJ4 2.2MMX240CM -- RADIAL JAW 4 BX/40

## (undated) DEVICE — TRAP SURG QUAD PARABOLA SLOT DSGN SFTY SCRN TRAPEASE

## (undated) DEVICE — SOLIDIFIER FLUID 3000 CC ABSORB

## (undated) DEVICE — QUILTED PREMIUM COMFORT UNDERPAD,EXTRA HEAVY: Brand: WINGS

## (undated) DEVICE — Device: Brand: MEDICAL ACTION INDUSTRIES

## (undated) DEVICE — SET ADMIN 16ML TBNG L100IN 2 Y INJ SITE IV PIGGY BK DISP

## (undated) DEVICE — CATH IV AUTOGRD BC BLU 22GA 25 -- INSYTE

## (undated) DEVICE — CONTAINER SPEC 20 ML LID NEUT BUFF FORMALIN 10 % POLYPR STS

## (undated) DEVICE — FCPS RAD JAW 4LC 240CM W/NDL -- BX/40